# Patient Record
Sex: MALE | Race: WHITE | Employment: PART TIME | URBAN - METROPOLITAN AREA
[De-identification: names, ages, dates, MRNs, and addresses within clinical notes are randomized per-mention and may not be internally consistent; named-entity substitution may affect disease eponyms.]

---

## 2017-10-13 ENCOUNTER — HOSPITAL ENCOUNTER (OUTPATIENT)
Dept: ULTRASOUND IMAGING | Age: 22
Discharge: HOME | End: 2017-10-13
Attending: INTERNAL MEDICINE
Payer: COMMERCIAL

## 2017-10-13 VITALS
WEIGHT: 234 LBS | RESPIRATION RATE: 12 BRPM | OXYGEN SATURATION: 100 % | TEMPERATURE: 98 F | SYSTOLIC BLOOD PRESSURE: 119 MMHG | HEART RATE: 67 BPM | DIASTOLIC BLOOD PRESSURE: 72 MMHG | BODY MASS INDEX: 33.5 KG/M2 | HEIGHT: 70 IN

## 2017-10-13 DIAGNOSIS — Z87.19 PERSONAL HISTORY OF DIGESTIVE SYSTEM DISEASE: ICD-10-CM

## 2017-10-13 LAB
BUN BLD-MCNC: 14 MG/DL (ref 8–26)
CA-I BLD-MCNC: 1.35 MMOL/L (ref 1.12–1.32)
CHLORIDE BLD-SCNC: 102 MMOL/L (ref 98–107)
CO2 BLD-SCNC: 25 MMOL/L (ref 21–32)
CREAT BLD-MCNC: 0.9 MG/DL (ref 0.8–1.5)
GLUCOSE BLD-MCNC: 99 MG/DL (ref 65–100)
POTASSIUM BLD-SCNC: 4 MMOL/L (ref 3.5–5.1)
SODIUM BLD-SCNC: 141 MMOL/L (ref 136–145)

## 2017-10-13 PROCEDURE — 80047 BASIC METABLC PNL IONIZED CA: CPT

## 2017-10-13 PROCEDURE — 88312 SPECIAL STAINS GROUP 1: CPT | Performed by: INTERNAL MEDICINE

## 2017-10-13 PROCEDURE — 88307 TISSUE EXAM BY PATHOLOGIST: CPT | Performed by: INTERNAL MEDICINE

## 2017-10-13 PROCEDURE — 74011000250 HC RX REV CODE- 250: Performed by: RADIOLOGY

## 2017-10-13 PROCEDURE — 74011250636 HC RX REV CODE- 250/636: Performed by: RADIOLOGY

## 2017-10-13 PROCEDURE — 99152 MOD SED SAME PHYS/QHP 5/>YRS: CPT

## 2017-10-13 PROCEDURE — 74011250636 HC RX REV CODE- 250/636

## 2017-10-13 PROCEDURE — 88313 SPECIAL STAINS GROUP 2: CPT | Performed by: INTERNAL MEDICINE

## 2017-10-13 PROCEDURE — 47000 NEEDLE BIOPSY OF LIVER PERQ: CPT

## 2017-10-13 RX ORDER — MIDAZOLAM HYDROCHLORIDE 1 MG/ML
.25-2 INJECTION, SOLUTION INTRAMUSCULAR; INTRAVENOUS
Status: DISCONTINUED | OUTPATIENT
Start: 2017-10-13 | End: 2017-10-17 | Stop reason: HOSPADM

## 2017-10-13 RX ORDER — LIDOCAINE HYDROCHLORIDE 20 MG/ML
40-120 INJECTION, SOLUTION INFILTRATION; PERINEURAL ONCE
Status: COMPLETED | OUTPATIENT
Start: 2017-10-13 | End: 2017-10-13

## 2017-10-13 RX ORDER — MORPHINE SULFATE 4 MG/ML
4 INJECTION, SOLUTION INTRAMUSCULAR; INTRAVENOUS
Status: COMPLETED | OUTPATIENT
Start: 2017-10-13 | End: 2017-10-13

## 2017-10-13 RX ORDER — SODIUM CHLORIDE 9 MG/ML
25 INJECTION, SOLUTION INTRAVENOUS CONTINUOUS
Status: DISCONTINUED | OUTPATIENT
Start: 2017-10-13 | End: 2017-10-17 | Stop reason: HOSPADM

## 2017-10-13 RX ORDER — DIPHENHYDRAMINE HYDROCHLORIDE 50 MG/ML
12.5-5 INJECTION, SOLUTION INTRAMUSCULAR; INTRAVENOUS ONCE
Status: COMPLETED | OUTPATIENT
Start: 2017-10-13 | End: 2017-10-13

## 2017-10-13 RX ORDER — FENTANYL CITRATE 50 UG/ML
12.5-1 INJECTION, SOLUTION INTRAMUSCULAR; INTRAVENOUS
Status: DISCONTINUED | OUTPATIENT
Start: 2017-10-13 | End: 2017-10-17 | Stop reason: HOSPADM

## 2017-10-13 RX ADMIN — SODIUM CHLORIDE 25 ML/HR: 900 INJECTION, SOLUTION INTRAVENOUS at 08:00

## 2017-10-13 RX ADMIN — FENTANYL CITRATE 50 MCG: 50 INJECTION, SOLUTION INTRAMUSCULAR; INTRAVENOUS at 08:14

## 2017-10-13 RX ADMIN — MIDAZOLAM HYDROCHLORIDE 1 MG: 1 INJECTION, SOLUTION INTRAMUSCULAR; INTRAVENOUS at 08:13

## 2017-10-13 RX ADMIN — DIPHENHYDRAMINE HYDROCHLORIDE 50 MG: 50 INJECTION, SOLUTION INTRAMUSCULAR; INTRAVENOUS at 08:25

## 2017-10-13 RX ADMIN — LIDOCAINE HYDROCHLORIDE 60 MG: 20 INJECTION, SOLUTION INFILTRATION; PERINEURAL at 08:21

## 2017-10-13 RX ADMIN — MIDAZOLAM HYDROCHLORIDE 1 MG: 1 INJECTION, SOLUTION INTRAMUSCULAR; INTRAVENOUS at 08:23

## 2017-10-13 RX ADMIN — FENTANYL CITRATE 50 MCG: 50 INJECTION, SOLUTION INTRAMUSCULAR; INTRAVENOUS at 08:24

## 2017-10-13 RX ADMIN — MORPHINE SULFATE 4 MG: 4 INJECTION, SOLUTION INTRAMUSCULAR; INTRAVENOUS at 09:22

## 2017-10-13 NOTE — PROGRESS NOTES
IR Nurse Pre-Procedure Checklist Part 2          Consent signed: Yes    H&P complete:  Yes    Antibiotics: Not applicable    Airway/Mallampati Done: yes    Shaved: Not applicable    Pregnancy Form:Not applicable    Patient Position: Yes    MD Side: Yes     Biopsy Worksheet: Yes    Specimen Medium: Yes

## 2017-10-13 NOTE — PROCEDURES
Department of Interventional Radiology  [phone]        Interventional Radiology Brief Procedure Note    Patient: Emma Flores MRN: [id number]  SSN: [id number]    YOB: 1995  Age: 25 y.o.   Sex: male      Date of Procedure: 10/13/2017    Pre-Procedure Diagnosis: Elevated LFT's    Post-Procedure Diagnosis: SAME    Procedure(s): Image Guided Biopsy    Description of Procedure: As above    Performed By: Segundo Carias MD     Assistants: None    Anesthesia:Moderate Sedation    Estimated Blood Loss: None    Specimens: Liver, right lobe    Implants: None    Findings: Successful core x 5 bx    Complications: None    Recommendations: Recovery     Follow Up: PRN    Signed By: Segundo Carias MD     October 13, 2017

## 2017-10-13 NOTE — PROGRESS NOTES
Patient to [address] for procedure. Patient awake and alert, verbalized name, , and procedure to be performed. Patient moved to procedure table independently.

## 2017-10-13 NOTE — PROGRESS NOTES
Patient to [facility] [facility] room 1 for procedure. Patient awake and alert, verbalized name, , and procedure to be performed. Patient moved to procedure table independently.

## 2017-10-13 NOTE — IP AVS SNAPSHOT
[address] [place] [address] 
[phone] Patient: Cait England MRN: [id number] :1995 You are allergic to the following No active allergies Recent Documentation Height Weight BMI Smoking Status 1.778 m (55 %, Z= 0.14)* 106.1 kg (98 %, Z= 2.12)* 33.58 kg/m2 (98 %, Z= 2.04)* Never Smoker *Growth percentiles are based on [facility] 2-20 Years data. Emergency Contacts Name Discharge Info Relation Home Work Mobile Brock Gonzalez  Mother [14] [phone] About your hospitalization You were admitted on:  2017 You last received care in the:  [facility] RADIOLOGY ULTRASOUND You were discharged on:  2017 Unit phone number:  [phone] Why you were hospitalized Your primary diagnosis was:  Not on File Providers Seen During Your Hospitalizations Provider Role Specialty Primary office phone Anthony Coronado MD Attending Provider Gastroenterology [phone] Your Primary Care Physician (PCP) Primary Care Physician Office Phone Office Fax [place] [employer] [phone] Follow-up Information None Your Appointments 2017  8:00 AM EDT  
US GUIDED BIOPSY KHUSHBU PERC with SFD US LOGIC 7 UNIT 1, SFD NURSING, SFD IR RADIOLOGIST RESOURCE, SFD IR ANES NOT REQUIRED  
[facility] RADIOLOGY ULTRASOUND ([address] [place] [address]) [address] [place] [address]  
[phone] Interventional Radiology procedure completed in Ultrasound. Referring Physicians: 1) Fax H&P/recent office notes and lab work, no older than 30 days (CBC, BMP, PT/PTT) to Interventional Radiology to facilitate prompt scheduling.  2)Patients with contrast dye allergies must be pre medicated prior to arrival. 3) Obtain clearance to hold blood thinners from prescribing Physician and give Patient instructions prior to arrival. 4)Hold oral diabetic medications the day of the procedure. If Insulin is required, take 1/2 dose the day of the procedure. 5) Pt should not eat or drink anything past midnight 6) Pt to arrive 1 hour to 1.5 hours early depending upon sedation method. 7) Responsible adult  required to drive Patient home after recovery period. Recovery period can vary depending on sedation and patient condition. 8) Requires approval from Radiologist prior to scheduling 9) Interventional Radiology Scheduling can be contacted at [phone] Current Discharge Medication List  
  
ASK your doctor about these medications Dose & Instructions Dispensing Information Comments Morning Noon Evening Bedtime METHAMPHETAMINE PO Your last dose was: Your next dose is:    
   
   
 Dose:  30 mg Take 30 mg by mouth. Attention deficit Refills:  0 Discharge Instructions [place] 68 654 [address] Department of Interventional Radiology [facility] 
[phone] Office 
[phone] Fax BIOPSY DISCHARGE INSTRUCTIONS General Instructions: A biopsy is the removal of a small piece of tissue for microscopic examination or testing. Healthy tissue can be obtained for the purpose of tissue-type matching for transplants. Unhealthy tissues are more commonly biopsied to diagnose disease. Liver Biopsy: This test helps detect cancer, infections, and the cause of an enlargement of the liver or elevated liver enzymes. It also helps to diagnose a number of liver diseases. The pain associated with the procedure may be felt in the shoulder. The risks include internal bleeding, pneumothorax, and injury to the surrounding organs. General Biopsy: 
   A mass can grow in any area of the body, and we are taking a specimen as ordered by your doctor. The risks are the same.  They include bleeding, pain, and infection. Home Care Instructions: You may resume your regular diet and medication regimen. Do not drink alcohol, drive, or make any important legal decisions in the next 24 hours. Do not lift anything heavier than a gallon of milk until the soreness goes away. You may use over the counter acetaminophen or ibuprofen for the soreness. You may apply an ice pack to the affected area for 20-30 minutes at time for the first 24 hours. After that, you may apply a heat pack. Call If: You should call your Physician and/or the Radiology Nurse if you have any questions or concerns about the biopsy site. Call if you should have increased pain, fever, redness, drainage, or bleeding more than a small spot on the bandage. Follow-Up Instructions: Please see your ordering doctor as he/she has requested. To Reach Us: If you have any questions about your procedure, please call the Interventional Radiology department at [phone]. After business hours (5pm) and weekends, call the answering service at [phone] and ask for the Radiologist on call to be paged. Interventional Radiology General Nurse Discharge After general anesthesia or intravenous sedation, for 24 hours or while taking prescription Narcotics: · Limit your activities · Do not drive and operate hazardous machinery · Do not make important personal or business decisions · Do  not drink alcoholic beverages · If you have not urinated within 8 hours after discharge, please contact your surgeon on call. * Please give a list of your current medications to your Primary Care Provider. * Please update this list whenever your medications are discontinued, doses are 
   changed, or new medications (including over-the-counter products) are added. * Please carry medication information at all times in case of emergency situations. These are general instructions for a healthy lifestyle: No smoking/ No tobacco products/ Avoid exposure to second hand smoke Surgeon General's Warning:  Quitting smoking now greatly reduces serious risk to your health. Obesity, smoking, and sedentary lifestyle greatly increases your risk for illness A healthy diet, regular physical exercise & weight monitoring are important for maintaining a healthy lifestyle You may be retaining fluid if you have a history of heart failure or if you experience any of the following symptoms:  Weight gain of 3 pounds or more overnight or 5 pounds in a week, increased swelling in our hands or feet or shortness of breath while lying flat in bed. Please call your doctor as soon as you notice any of these symptoms; do not wait until your next office visit. Recognize signs and symptoms of STROKE: 
F-face looks uneven A-arms unable to move or move unevenly S-speech slurred or non-existent T-time-call 911 as soon as signs and symptoms begin-DO NOT go Back to bed or wait to see if you get better-TIME IS BRAIN. Patient Signature: 
Date: 10/13/2017 Discharging Nurse: Yovani Kidd RN Discharge Orders None Introducing [facility]! [employer] introduces [employer] patient portal. Now you can access parts of your medical record, email your doctor's office, and request medication refills online. 1. In your internet browser, go to [email or web address]. [email or web address] 2. Click on the First Time User? Click Here link in the Sign In box. You will see the New Member Sign Up page. 3. Enter your [employer] Access Code exactly as it appears below. You will not need to use this code after youve completed the sign-up process. If you do not sign up before the expiration date, you must request a new code. · [employer] Access Code: [email or web address] Expires: 12/27/2017  4:27 PM 
 
4.  Enter the last four digits of your Social Security Number (xxxx) and Date of Birth (mm/dd/yyyy) as indicated and click Submit. You will be taken to the next sign-up page. 5. Create a [employer] ID. This will be your [employer] login ID and cannot be changed, so think of one that is secure and easy to remember. 6. Create a [employer] password. You can change your password at any time. 7. Enter your Password Reset Question and Answer. This can be used at a later time if you forget your password. 8. Enter your e-mail address. You will receive e-mail notification when new information is available in [address] 9. Click Sign Up. You can now view and download portions of your medical record. 10. Click the Download Summary menu link to download a portable copy of your medical information. If you have questions, please visit the Frequently Asked Questions section of the [employer] website. Remember, [employer] is NOT to be used for urgent needs. For medical emergencies, dial 911. Now available from your iPhone and Android! General Information Please provide this summary of care documentation to your next provider. Patient Signature:  ____________________________________________________________ Date:  ____________________________________________________________  
  
Claude Magallon Provider Signature:  ____________________________________________________________ Date:  ____________________________________________________________

## 2017-10-13 NOTE — PROGRESS NOTES
TRANSFER - IN REPORT:    Verbal report received from [address], RN(name) on [employer]  being received from procedure(unit) for routine progression of care      Report consisted of patients Situation, Background, Assessment and   Recommendations(SBAR). Information from the following report(s) SBAR, Procedure Summary and MAR was reviewed with the receiving nurse. Opportunity for questions and clarification was provided. Assessment completed upon patients arrival to unit and care assumed.

## 2017-10-13 NOTE — PROGRESS NOTES
Patient transported back to IR Recovery room 2, report of care received given to [facility]. Patient tolerating well.

## 2017-10-13 NOTE — DISCHARGE INSTRUCTIONS
[address]  Department of Interventional Radiology  [facility]  [phone] Office  [phone] Fax    BIOPSY DISCHARGE INSTRUCTIONS    General Instructions:     A biopsy is the removal of a small piece of tissue for microscopic examination or testing. Healthy tissue can be obtained for the purpose of tissue-type matching for transplants. Unhealthy tissues are more commonly biopsied to diagnose disease. Liver Biopsy: This test helps detect cancer, infections, and the cause of an enlargement of the liver or elevated liver enzymes. It also helps to diagnose a number of liver diseases. The pain associated with the procedure may be felt in the shoulder. The risks include internal bleeding, pneumothorax, and injury to the surrounding organs. General Biopsy:     A mass can grow in any area of the body, and we are taking a specimen as ordered by your doctor. The risks are the same. They include bleeding, pain, and infection. Home Care Instructions: You may resume your regular diet and medication regimen. Do not drink alcohol, drive, or make any important legal decisions in the next 24 hours. Do not lift anything heavier than a gallon of milk until the soreness goes away. You may use over the counter acetaminophen or ibuprofen for the soreness. You may apply an ice pack to the affected area for 20-30 minutes at time for the first 24 hours. After that, you may apply a heat pack. Call If: You should call your Physician and/or the Radiology Nurse if you have any questions or concerns about the biopsy site. Call if you should have increased pain, fever, redness, drainage, or bleeding more than a small spot on the bandage. Follow-Up Instructions: Please see your ordering doctor as he/she has requested. To Reach Us:   If you have any questions about your procedure, please call the Interventional Radiology department at [phone]. After business hours (5pm) and weekends, call the answering service at [phone] and ask for the Radiologist on call to be paged. Interventional Radiology General Nurse Discharge    After general anesthesia or intravenous sedation, for 24 hours or while taking prescription Narcotics:  · Limit your activities  · Do not drive and operate hazardous machinery  · Do not make important personal or business decisions  · Do  not drink alcoholic beverages  · If you have not urinated within 8 hours after discharge, please contact your surgeon on call. * Please give a list of your current medications to your Primary Care Provider. * Please update this list whenever your medications are discontinued, doses are     changed, or new medications (including over-the-counter products) are added. * Please carry medication information at all times in case of emergency situations. These are general instructions for a healthy lifestyle:    No smoking/ No tobacco products/ Avoid exposure to second hand smoke  Surgeon General's Warning:  Quitting smoking now greatly reduces serious risk to your health. Obesity, smoking, and sedentary lifestyle greatly increases your risk for illness  A healthy diet, regular physical exercise & weight monitoring are important for maintaining a healthy lifestyle    You may be retaining fluid if you have a history of heart failure or if you experience any of the following symptoms:  Weight gain of 3 pounds or more overnight or 5 pounds in a week, increased swelling in our hands or feet or shortness of breath while lying flat in bed. Please call your doctor as soon as you notice any of these symptoms; do not wait until your next office visit.     Recognize signs and symptoms of STROKE:  F-face looks uneven    A-arms unable to move or move unevenly    S-speech slurred or non-existent    T-time-call 911 as soon as signs and symptoms begin-DO NOT go       Back to bed or wait to see if you get better-TIME IS BRAIN.         Patient Signature:  Date: 10/13/2017  Discharging Nurse: Froy Atwood RN

## 2017-10-13 NOTE — PROGRESS NOTES
Recovery period without difficulty. Discharge instructions provided to and reviewed with Patient; verbalized understanding of instructions. Time for questions and/or concerns allowed. No questions reported at this time. Dressing to right abdomen noted to be clean, dry, and intact. Pt assisted to lobby discharge area via wheelchair.

## 2017-10-13 NOTE — PROGRESS NOTES
Patient resting quietly in bed. Respirations present. No distress noted at this time. Girlfriend at bedside. Dressing clean, dry, and intact.

## 2019-06-03 NOTE — PROGRESS NOTES
B12 injection left deltoid IM  [id number]  Exp-12/20  [id number]  [facility]   Patient appears anxious and complains of pain. Dressing clean, dry, and intact. VSS. Patient placed on 2lpm oxygen for comfort. Dr. Rajesh Addison notified. Med given as ordered. See MAR.